# Patient Record
Sex: MALE | Race: ASIAN | Employment: OTHER | URBAN - METROPOLITAN AREA
[De-identification: names, ages, dates, MRNs, and addresses within clinical notes are randomized per-mention and may not be internally consistent; named-entity substitution may affect disease eponyms.]

---

## 2018-07-27 ENCOUNTER — HOSPITAL ENCOUNTER (OUTPATIENT)
Dept: RADIATION ONCOLOGY | Age: 83
Discharge: HOME | End: 2018-07-27
Payer: MEDICARE

## 2018-07-27 VITALS
DIASTOLIC BLOOD PRESSURE: 75 MMHG | HEART RATE: 73 BPM | RESPIRATION RATE: 19 BRPM | SYSTOLIC BLOOD PRESSURE: 162 MMHG | TEMPERATURE: 97.9 F | WEIGHT: 177.6 LBS | HEIGHT: 66 IN | BODY MASS INDEX: 28.54 KG/M2 | OXYGEN SATURATION: 96 %

## 2018-07-27 DIAGNOSIS — C61 CANCER OF PROSTATE WITH HIGH RECURRENCE RISK (STAGE T3A OR GLEASON 8-10 OR PSA > 20): ICD-10-CM

## 2018-07-27 PROCEDURE — 99212 OFFICE O/P EST SF 10 MIN: CPT | Performed by: RADIOLOGY

## 2018-07-27 RX ORDER — ATORVASTATIN CALCIUM 10 MG/1
10 TABLET, FILM COATED ORAL NIGHTLY
COMMUNITY

## 2018-07-27 RX ORDER — LOSARTAN POTASSIUM AND HYDROCHLOROTHIAZIDE 12.5; 1 MG/1; MG/1
1 TABLET ORAL DAILY
COMMUNITY

## 2018-07-27 RX ORDER — AMLODIPINE BESYLATE 10 MG/1
10 TABLET ORAL DAILY
COMMUNITY

## 2018-07-27 RX ORDER — WARFARIN SODIUM 5 MG/1
5 TABLET ORAL DAILY
COMMUNITY

## 2018-07-27 RX ORDER — DIGOXIN 125 MCG
125 TABLET ORAL DAILY
COMMUNITY

## 2018-08-31 ENCOUNTER — HOSPITAL ENCOUNTER (OUTPATIENT)
Dept: RADIATION ONCOLOGY | Age: 83
Discharge: HOME | End: 2018-08-31
Payer: MEDICARE

## 2018-08-31 PROCEDURE — 55876 PLACE RT DEVICE/MARKER PROS: CPT | Performed by: RADIOLOGY

## 2018-08-31 PROCEDURE — 55874 TPRNL PLMT BIODEGRDABL MATRL: CPT | Performed by: RADIOLOGY

## 2018-08-31 PROCEDURE — 99214 OFFICE O/P EST MOD 30 MIN: CPT | Performed by: RADIOLOGY

## 2018-08-31 PROCEDURE — A4648 IMPLANTABLE TISSUE MARKER: HCPCS

## 2018-08-31 PROCEDURE — 76942 ECHO GUIDE FOR BIOPSY: CPT | Performed by: RADIOLOGY

## 2018-09-17 ENCOUNTER — HOSPITAL ENCOUNTER (OUTPATIENT)
Dept: RADIATION ONCOLOGY | Age: 83
Discharge: HOME | End: 2018-09-17
Payer: COMMERCIAL

## 2018-09-17 ENCOUNTER — HOSPITAL ENCOUNTER (OUTPATIENT)
Dept: MRI IMAGING | Age: 83
Discharge: HOME | End: 2018-09-19
Payer: COMMERCIAL

## 2018-09-17 DIAGNOSIS — C61 PROSTATE CA (HCC): ICD-10-CM

## 2018-09-17 PROCEDURE — 6360000004 HC RX CONTRAST MEDICATION: Performed by: RADIOLOGY

## 2018-09-17 PROCEDURE — 77290 THER RAD SIMULAJ FIELD CPLX: CPT | Performed by: RADIOLOGY

## 2018-09-17 PROCEDURE — A9577 INJ MULTIHANCE: HCPCS | Performed by: RADIOLOGY

## 2018-09-17 PROCEDURE — 72197 MRI PELVIS W/O & W/DYE: CPT

## 2018-09-17 PROCEDURE — 99214 OFFICE O/P EST MOD 30 MIN: CPT | Performed by: RADIOLOGY

## 2018-09-17 PROCEDURE — 77334 RADIATION TREATMENT AID(S): CPT | Performed by: RADIOLOGY

## 2018-09-17 RX ADMIN — GADOBENATE DIMEGLUMINE 15 ML: 529 INJECTION, SOLUTION INTRAVENOUS at 11:35

## 2018-09-18 PROCEDURE — 77399 UNLISTED PX MED RADJ PHYSICS: CPT | Performed by: RADIOLOGY

## 2018-09-21 LAB
GFR AFRICAN AMERICAN: 41
GFR NON-AFRICAN AMERICAN: 34
PERFORMED ON: ABNORMAL
POC CREATININE: 1.9 MG/DL (ref 0.8–1.3)
POC SAMPLE TYPE: ABNORMAL

## 2018-09-25 PROCEDURE — 77280 THER RAD SIMULAJ FIELD SMPL: CPT | Performed by: RADIOLOGY

## 2018-09-25 PROCEDURE — 77387 GUIDANCE FOR RADJ TX DLVR: CPT | Performed by: RADIOLOGY

## 2018-09-25 PROCEDURE — 77338 DESIGN MLC DEVICE FOR IMRT: CPT | Performed by: RADIOLOGY

## 2018-09-25 PROCEDURE — 77300 RADIATION THERAPY DOSE PLAN: CPT | Performed by: RADIOLOGY

## 2018-09-25 PROCEDURE — 77301 RADIOTHERAPY DOSE PLAN IMRT: CPT | Performed by: RADIOLOGY

## 2018-09-27 PROCEDURE — 77385 HC NTSTY MODUL RAD TX DLVR SMPL: CPT | Performed by: RADIOLOGY

## 2018-09-28 PROCEDURE — 77385 HC NTSTY MODUL RAD TX DLVR SMPL: CPT | Performed by: RADIOLOGY

## 2018-10-01 ENCOUNTER — HOSPITAL ENCOUNTER (OUTPATIENT)
Dept: RADIATION ONCOLOGY | Age: 83
Discharge: HOME | End: 2018-10-01
Payer: MEDICARE

## 2018-10-01 PROCEDURE — 77385 HC NTSTY MODUL RAD TX DLVR SMPL: CPT | Performed by: RADIOLOGY

## 2018-10-02 PROCEDURE — 99211 OFF/OP EST MAY X REQ PHY/QHP: CPT | Performed by: RADIOLOGY

## 2018-10-02 PROCEDURE — 77385 HC NTSTY MODUL RAD TX DLVR SMPL: CPT | Performed by: RADIOLOGY

## 2018-10-03 PROCEDURE — 77336 RADIATION PHYSICS CONSULT: CPT | Performed by: RADIOLOGY

## 2018-10-03 PROCEDURE — 77385 HC NTSTY MODUL RAD TX DLVR SMPL: CPT | Performed by: RADIOLOGY

## 2018-10-04 PROCEDURE — 77385 HC NTSTY MODUL RAD TX DLVR SMPL: CPT | Performed by: RADIOLOGY

## 2018-10-05 PROCEDURE — 77385 HC NTSTY MODUL RAD TX DLVR SMPL: CPT | Performed by: RADIOLOGY

## 2018-10-08 PROCEDURE — 77300 RADIATION THERAPY DOSE PLAN: CPT | Performed by: RADIOLOGY

## 2018-10-08 PROCEDURE — 77385 HC NTSTY MODUL RAD TX DLVR SMPL: CPT | Performed by: RADIOLOGY

## 2018-10-09 PROCEDURE — 99212 OFFICE O/P EST SF 10 MIN: CPT | Performed by: RADIOLOGY

## 2018-10-09 PROCEDURE — 77385 HC NTSTY MODUL RAD TX DLVR SMPL: CPT | Performed by: RADIOLOGY

## 2018-10-10 PROCEDURE — 77385 HC NTSTY MODUL RAD TX DLVR SMPL: CPT | Performed by: RADIOLOGY

## 2018-10-10 PROCEDURE — 77336 RADIATION PHYSICS CONSULT: CPT | Performed by: RADIOLOGY

## 2018-10-10 PROCEDURE — 99211 OFF/OP EST MAY X REQ PHY/QHP: CPT | Performed by: RADIOLOGY

## 2018-10-11 PROCEDURE — 77385 HC NTSTY MODUL RAD TX DLVR SMPL: CPT | Performed by: RADIOLOGY

## 2018-10-12 PROCEDURE — 77385 HC NTSTY MODUL RAD TX DLVR SMPL: CPT | Performed by: RADIOLOGY

## 2018-10-15 PROCEDURE — 77385 HC NTSTY MODUL RAD TX DLVR SMPL: CPT | Performed by: RADIOLOGY

## 2018-10-16 PROCEDURE — 99212 OFFICE O/P EST SF 10 MIN: CPT | Performed by: RADIOLOGY

## 2018-10-16 PROCEDURE — 77385 HC NTSTY MODUL RAD TX DLVR SMPL: CPT | Performed by: RADIOLOGY

## 2018-10-17 PROCEDURE — 77336 RADIATION PHYSICS CONSULT: CPT | Performed by: RADIOLOGY

## 2018-10-17 PROCEDURE — 77385 HC NTSTY MODUL RAD TX DLVR SMPL: CPT | Performed by: RADIOLOGY

## 2018-10-18 PROCEDURE — 77385 HC NTSTY MODUL RAD TX DLVR SMPL: CPT | Performed by: RADIOLOGY

## 2018-10-19 PROCEDURE — 77385 HC NTSTY MODUL RAD TX DLVR SMPL: CPT | Performed by: RADIOLOGY

## 2018-10-22 PROCEDURE — 77385 HC NTSTY MODUL RAD TX DLVR SMPL: CPT | Performed by: RADIOLOGY

## 2018-10-23 PROCEDURE — 77385 HC NTSTY MODUL RAD TX DLVR SMPL: CPT | Performed by: RADIOLOGY

## 2018-10-23 PROCEDURE — 99212 OFFICE O/P EST SF 10 MIN: CPT | Performed by: RADIOLOGY

## 2018-10-24 PROCEDURE — 77385 HC NTSTY MODUL RAD TX DLVR SMPL: CPT | Performed by: RADIOLOGY

## 2018-10-24 PROCEDURE — 77336 RADIATION PHYSICS CONSULT: CPT | Performed by: RADIOLOGY

## 2018-10-25 PROCEDURE — 77385 HC NTSTY MODUL RAD TX DLVR SMPL: CPT | Performed by: RADIOLOGY

## 2018-10-26 PROCEDURE — 77385 HC NTSTY MODUL RAD TX DLVR SMPL: CPT | Performed by: RADIOLOGY

## 2018-10-29 PROCEDURE — 77385 HC NTSTY MODUL RAD TX DLVR SMPL: CPT | Performed by: RADIOLOGY

## 2018-10-30 VITALS
RESPIRATION RATE: 16 BRPM | HEART RATE: 62 BPM | OXYGEN SATURATION: 97 % | SYSTOLIC BLOOD PRESSURE: 172 MMHG | DIASTOLIC BLOOD PRESSURE: 79 MMHG | WEIGHT: 174.8 LBS | TEMPERATURE: 96.6 F | BODY MASS INDEX: 28.21 KG/M2

## 2018-10-30 PROCEDURE — 77385 HC NTSTY MODUL RAD TX DLVR SMPL: CPT | Performed by: RADIOLOGY

## 2018-10-30 PROCEDURE — 99212 OFFICE O/P EST SF 10 MIN: CPT | Performed by: RADIOLOGY

## 2018-10-31 PROCEDURE — 77385 HC NTSTY MODUL RAD TX DLVR SMPL: CPT | Performed by: RADIOLOGY

## 2018-10-31 PROCEDURE — 77336 RADIATION PHYSICS CONSULT: CPT | Performed by: RADIOLOGY

## 2018-11-01 ENCOUNTER — HOSPITAL ENCOUNTER (OUTPATIENT)
Dept: RADIATION ONCOLOGY | Age: 83
Discharge: HOME | End: 2018-11-01
Payer: MEDICARE

## 2018-11-01 PROCEDURE — 77385 HC NTSTY MODUL RAD TX DLVR SMPL: CPT | Performed by: RADIOLOGY

## 2018-11-02 PROCEDURE — 77385 HC NTSTY MODUL RAD TX DLVR SMPL: CPT | Performed by: RADIOLOGY

## 2018-11-05 PROCEDURE — 77385 HC NTSTY MODUL RAD TX DLVR SMPL: CPT | Performed by: RADIOLOGY

## 2018-11-06 PROCEDURE — 77385 HC NTSTY MODUL RAD TX DLVR SMPL: CPT | Performed by: RADIOLOGY

## 2018-11-06 PROCEDURE — 99212 OFFICE O/P EST SF 10 MIN: CPT | Performed by: RADIOLOGY

## 2018-11-07 PROCEDURE — 77385 HC NTSTY MODUL RAD TX DLVR SMPL: CPT | Performed by: RADIOLOGY

## 2018-11-07 PROCEDURE — 77336 RADIATION PHYSICS CONSULT: CPT | Performed by: RADIOLOGY

## 2018-11-08 PROCEDURE — 77385 HC NTSTY MODUL RAD TX DLVR SMPL: CPT | Performed by: RADIOLOGY

## 2018-11-09 PROCEDURE — 77385 HC NTSTY MODUL RAD TX DLVR SMPL: CPT | Performed by: RADIOLOGY

## 2018-11-12 PROCEDURE — 77385 HC NTSTY MODUL RAD TX DLVR SMPL: CPT | Performed by: RADIOLOGY

## 2018-11-13 PROCEDURE — 99213 OFFICE O/P EST LOW 20 MIN: CPT | Performed by: RADIOLOGY

## 2018-11-13 PROCEDURE — 77385 HC NTSTY MODUL RAD TX DLVR SMPL: CPT | Performed by: RADIOLOGY

## 2018-11-13 RX ORDER — LOPERAMIDE HYDROCHLORIDE 2 MG/1
2 CAPSULE ORAL 4 TIMES DAILY PRN
COMMUNITY

## 2018-11-14 PROCEDURE — 77336 RADIATION PHYSICS CONSULT: CPT | Performed by: RADIOLOGY

## 2018-11-14 PROCEDURE — 77385 HC NTSTY MODUL RAD TX DLVR SMPL: CPT | Performed by: RADIOLOGY

## 2018-11-15 PROCEDURE — 77385 HC NTSTY MODUL RAD TX DLVR SMPL: CPT | Performed by: RADIOLOGY

## 2018-11-16 PROCEDURE — 77385 HC NTSTY MODUL RAD TX DLVR SMPL: CPT | Performed by: RADIOLOGY

## 2018-11-19 PROCEDURE — 77385 HC NTSTY MODUL RAD TX DLVR SMPL: CPT | Performed by: RADIOLOGY

## 2018-11-20 VITALS
BODY MASS INDEX: 28.44 KG/M2 | WEIGHT: 176.2 LBS | OXYGEN SATURATION: 98 % | HEART RATE: 65 BPM | RESPIRATION RATE: 16 BRPM | DIASTOLIC BLOOD PRESSURE: 74 MMHG | SYSTOLIC BLOOD PRESSURE: 169 MMHG | TEMPERATURE: 96.8 F

## 2018-11-20 PROCEDURE — 77385 HC NTSTY MODUL RAD TX DLVR SMPL: CPT | Performed by: RADIOLOGY

## 2018-11-20 PROCEDURE — 99212 OFFICE O/P EST SF 10 MIN: CPT | Performed by: RADIOLOGY

## 2018-11-21 PROCEDURE — 77336 RADIATION PHYSICS CONSULT: CPT | Performed by: RADIOLOGY

## 2019-02-27 ENCOUNTER — HOSPITAL ENCOUNTER (OUTPATIENT)
Dept: RADIATION ONCOLOGY | Age: 84
Discharge: HOME | End: 2019-02-27
Payer: COMMERCIAL

## 2019-02-27 VITALS
DIASTOLIC BLOOD PRESSURE: 67 MMHG | WEIGHT: 181.6 LBS | HEART RATE: 83 BPM | SYSTOLIC BLOOD PRESSURE: 147 MMHG | BODY MASS INDEX: 29.31 KG/M2 | TEMPERATURE: 97.8 F | OXYGEN SATURATION: 98 % | RESPIRATION RATE: 16 BRPM

## 2019-02-27 PROCEDURE — 99213 OFFICE O/P EST LOW 20 MIN: CPT | Performed by: RADIOLOGY

## 2019-02-27 RX ORDER — TAMSULOSIN HYDROCHLORIDE 0.4 MG/1
0.4 CAPSULE ORAL DAILY
Qty: 90 CAPSULE | Refills: 1 | Status: SHIPPED | OUTPATIENT
Start: 2019-02-27

## 2019-09-16 ENCOUNTER — HOSPITAL ENCOUNTER (OUTPATIENT)
Dept: RADIATION ONCOLOGY | Age: 84
Discharge: HOME | End: 2019-09-16
Payer: COMMERCIAL

## 2019-09-16 VITALS
HEART RATE: 74 BPM | SYSTOLIC BLOOD PRESSURE: 145 MMHG | DIASTOLIC BLOOD PRESSURE: 62 MMHG | BODY MASS INDEX: 28.83 KG/M2 | WEIGHT: 178.6 LBS | OXYGEN SATURATION: 99 % | RESPIRATION RATE: 16 BRPM | TEMPERATURE: 97 F

## 2019-09-16 PROCEDURE — 99212 OFFICE O/P EST SF 10 MIN: CPT | Performed by: RADIOLOGY

## 2019-09-16 NOTE — ONCOLOGY
History     Substance and Sexual Activity   Alcohol Use Yes    Alcohol/week: 3.0 standard drinks    Types: 2 Glasses of wine, 1 Cans of beer per week    Comment: rare social drinker       ALLERGIES:   No Known Allergies     CURRENT MEDICATIONS:     Prior to Admission medications    Medication Sig Start Date End Date Taking? Authorizing Provider   tamsulosin (FLOMAX) 0.4 MG capsule Take 1 capsule by mouth daily Take 1/2 hour after last meal of the day. 2/27/19  Yes Cammy Sanders MD   Carbidopa-Levodopa (SINEMET PO) Take 1 tablet by mouth 2 times daily   Yes Historical Provider, MD   metFORMIN (GLUCOPHAGE) 1000 MG tablet Take 1,000 mg by mouth 2 times daily (with meals)   Yes Historical Provider, MD   losartan-hydrochlorothiazide (HYZAAR) 100-12.5 MG per tablet Take 1 tablet by mouth daily   Yes Historical Provider, MD   digoxin (DIGOX) 125 MCG tablet Take 125 mcg by mouth daily   Yes Historical Provider, MD   SITAGLIPTIN-METFORMIN HCL PO Take 1 tablet by mouth daily   Yes Historical Provider, MD   warfarin (COUMADIN) 5 MG tablet Take 5 mg by mouth daily Pt to take 5mg 5 days a week and 2.5mg twice a week   Yes Historical Provider, MD   amLODIPine (NORVASC) 10 MG tablet Take 10 mg by mouth daily   Yes Historical Provider, MD   Insulin Glargine (LANTUS SC) Inject 14 Units into the skin nightly   Yes Historical Provider, MD   atorvastatin (LIPITOR) 10 MG tablet Take 10 mg by mouth daily   Yes Historical Provider, MD   loperamide (IMODIUM) 2 MG capsule Take 2 mg by mouth 4 times daily as needed for Diarrhea    Historical Provider, MD     ECOG PERFORMANCE STATUS: 0    VITAL SIGNS:    Vitals:    09/16/19 1523   BP: (!) 145/62   Pulse: 74   Resp: 16   Temp: 97 °F (36.1 °C)   SpO2: 99%   Weight: 178 lb 9.6 oz (81 kg)     PHYSICAL EXAMINATION:  GENERAL: No acute distress. Alert, oriented, cooperative. With daughter. HEENT:  JOSE ROBERTO MELTON. Oral cavity WNL. NECK:  No cervical or supraclavicular adenopathy.   CHEST/LUNGS: CTA  CARDIOVASCULAR:  Mostly regular rate and rhythm with occasional ectopy, no definite murmur. ABDOMEN:  benign  EXTREMITIES: No cyanosis or clubbing, trace and stable pretibial edema L>R  RECTAL:  Deferred. Undetectable PSA, and resolution of abnormality on exam with last visit. No symptoms. STUDIES: PSA <0.10,  system, 9/7/19    IMPRESSION/PLAN: Clinically DERRICK, no significant symptoms related to prior treatment. I will see the patient back in 1 year. He sees Dr. Dirk Aguilar every 6 months. Electronically signed by Marion Leslie MD on 9/16/19 at 4:10 PM      Thank you for allowing us to participate in the care of this patient.   cc:   No att. providers found  MD Venecia Grewal, [address] [facility] [place] [address]

## 2020-05-20 ENCOUNTER — HOSPITAL ENCOUNTER (OUTPATIENT)
Dept: RADIATION ONCOLOGY | Age: 85
Discharge: HOME | End: 2020-05-20
Payer: COMMERCIAL

## 2020-05-20 VITALS
WEIGHT: 188.2 LBS | DIASTOLIC BLOOD PRESSURE: 67 MMHG | BODY MASS INDEX: 30.38 KG/M2 | TEMPERATURE: 97.4 F | SYSTOLIC BLOOD PRESSURE: 154 MMHG | RESPIRATION RATE: 18 BRPM | HEART RATE: 53 BPM

## 2020-05-20 PROCEDURE — 99212 OFFICE O/P EST SF 10 MIN: CPT | Performed by: RADIOLOGY

## 2020-05-20 RX ORDER — M-VIT,TX,IRON,MINS/CALC/FOLIC 27MG-0.4MG
1 TABLET ORAL DAILY
COMMUNITY

## 2020-05-20 RX ORDER — MEMANTINE HYDROCHLORIDE 10 MG/1
10 TABLET ORAL 2 TIMES DAILY
COMMUNITY

## 2023-04-28 ENCOUNTER — TELEPHONE (OUTPATIENT)
Dept: PRIMARY CARE | Facility: CLINIC | Age: 88
End: 2023-04-28
Payer: COMMERCIAL

## 2023-04-28 DIAGNOSIS — R26.0 ATAXIC GAIT: Primary | ICD-10-CM

## 2023-04-28 DIAGNOSIS — R53.81 DEBILITY: ICD-10-CM

## 2024-02-28 DIAGNOSIS — R97.20 ELEVATED PSA: ICD-10-CM

## 2024-02-28 DIAGNOSIS — C61 PROSTATE CANCER (MULTI): ICD-10-CM

## 2024-04-20 ENCOUNTER — LAB (OUTPATIENT)
Dept: LAB | Facility: LAB | Age: 89
End: 2024-04-20
Payer: COMMERCIAL

## 2024-04-20 DIAGNOSIS — C61 PROSTATE CANCER (MULTI): ICD-10-CM

## 2024-04-20 DIAGNOSIS — R97.20 ELEVATED PSA: ICD-10-CM

## 2024-04-20 LAB
ALBUMIN SERPL BCP-MCNC: 3.6 G/DL (ref 3.4–5)
ALP SERPL-CCNC: 54 U/L (ref 33–136)
ALT SERPL W P-5'-P-CCNC: 14 U/L (ref 10–52)
AST SERPL W P-5'-P-CCNC: 23 U/L (ref 9–39)
BILIRUB DIRECT SERPL-MCNC: 0.2 MG/DL (ref 0–0.3)
BILIRUB SERPL-MCNC: 0.6 MG/DL (ref 0–1.2)
PROT SERPL-MCNC: 6.7 G/DL (ref 6.4–8.2)
PSA SERPL-MCNC: 0.92 NG/ML
TESTOST SERPL-MCNC: <30 NG/DL (ref 240–1000)

## 2024-04-20 PROCEDURE — 80076 HEPATIC FUNCTION PANEL: CPT

## 2024-04-20 PROCEDURE — 84403 ASSAY OF TOTAL TESTOSTERONE: CPT

## 2024-04-20 PROCEDURE — 36415 COLL VENOUS BLD VENIPUNCTURE: CPT

## 2024-04-20 PROCEDURE — 84153 ASSAY OF PSA TOTAL: CPT

## 2024-04-26 ENCOUNTER — OFFICE VISIT (OUTPATIENT)
Dept: UROLOGY | Facility: CLINIC | Age: 89
End: 2024-04-26
Payer: COMMERCIAL

## 2024-04-26 VITALS
HEIGHT: 67 IN | WEIGHT: 177.69 LBS | RESPIRATION RATE: 16 BRPM | SYSTOLIC BLOOD PRESSURE: 102 MMHG | HEART RATE: 61 BPM | BODY MASS INDEX: 27.89 KG/M2 | DIASTOLIC BLOOD PRESSURE: 54 MMHG

## 2024-04-26 DIAGNOSIS — R35.1 NOCTURIA: ICD-10-CM

## 2024-04-26 DIAGNOSIS — C61 PROSTATE CANCER (MULTI): ICD-10-CM

## 2024-04-26 PROCEDURE — 1036F TOBACCO NON-USER: CPT | Performed by: UROLOGY

## 2024-04-26 PROCEDURE — 1126F AMNT PAIN NOTED NONE PRSNT: CPT | Performed by: UROLOGY

## 2024-04-26 PROCEDURE — 99213 OFFICE O/P EST LOW 20 MIN: CPT | Performed by: UROLOGY

## 2024-04-26 PROCEDURE — 1159F MED LIST DOCD IN RCRD: CPT | Performed by: UROLOGY

## 2024-04-26 PROCEDURE — 1157F ADVNC CARE PLAN IN RCRD: CPT | Performed by: UROLOGY

## 2024-04-26 RX ORDER — CARVEDILOL 6.25 MG/1
TABLET ORAL
COMMUNITY

## 2024-04-26 RX ORDER — DIGOXIN 125 MCG
125 TABLET ORAL DAILY
COMMUNITY

## 2024-04-26 RX ORDER — DAPAGLIFLOZIN 10 MG/1
10 TABLET, FILM COATED ORAL DAILY
COMMUNITY

## 2024-04-26 RX ORDER — AMLODIPINE BESYLATE 10 MG/1
10 TABLET ORAL DAILY
COMMUNITY

## 2024-04-26 RX ORDER — TAMSULOSIN HYDROCHLORIDE 0.4 MG/1
1 CAPSULE ORAL DAILY
COMMUNITY
Start: 2021-09-17

## 2024-04-26 RX ORDER — ALBUTEROL SULFATE 0.83 MG/ML
2.5 SOLUTION RESPIRATORY (INHALATION) DAILY
COMMUNITY
Start: 2024-04-13

## 2024-04-26 RX ORDER — ALBUTEROL SULFATE 90 UG/1
2 AEROSOL, METERED RESPIRATORY (INHALATION) EVERY 4 HOURS PRN
COMMUNITY
Start: 2021-11-03

## 2024-04-26 RX ORDER — WARFARIN SODIUM 5 MG/1
TABLET ORAL
COMMUNITY
Start: 2020-05-15

## 2024-04-26 RX ORDER — MEMANTINE HYDROCHLORIDE 10 MG/1
TABLET ORAL EVERY 24 HOURS
COMMUNITY

## 2024-04-26 RX ORDER — ATORVASTATIN CALCIUM 20 MG/1
20 TABLET, FILM COATED ORAL EVERY 24 HOURS
COMMUNITY
Start: 2018-02-14

## 2024-04-26 RX ORDER — FUROSEMIDE 40 MG/1
TABLET ORAL
COMMUNITY
Start: 2024-04-20

## 2024-04-26 RX ORDER — CHLORTHALIDONE 25 MG/1
25 TABLET ORAL
COMMUNITY

## 2024-04-26 RX ORDER — CARBIDOPA AND LEVODOPA 25; 100 MG/1; MG/1
TABLET ORAL EVERY 12 HOURS
COMMUNITY
Start: 2018-02-15

## 2024-04-26 RX ORDER — MONTELUKAST SODIUM 10 MG/1
10 TABLET ORAL DAILY
COMMUNITY
Start: 2024-04-13

## 2024-04-26 ASSESSMENT — ENCOUNTER SYMPTOMS
LOSS OF SENSATION IN FEET: 0
OCCASIONAL FEELINGS OF UNSTEADINESS: 1

## 2024-04-26 ASSESSMENT — PAIN SCALES - GENERAL: PAINLEVEL: 0-NO PAIN

## 2024-04-26 NOTE — PROGRESS NOTES
Patient present with daughter, Nikole and More. Patient denies any pain today. Patient has not had any recent surgeries or hospital admits. Patient denies any concerns about falling or safety. Patient has occasional incontinence, due to mobility.Patient taking Flomax as directed, stopped taking Casodex in October. CV     Review of Systems   Genitourinary:  Positive for enuresis.

## 2024-04-26 NOTE — LETTER
April 26, 2024     Steve Yates MD  [address]   [facility], [address]  [facility] [address]    Patient: Blaine Cordero   YOB: 1935   Date of Visit: 4/26/2024       Dear Dr. Steve Yates MD:    Thank you for referring Blaine Cordero to me for evaluation. Below are my notes for this consultation.  If you have questions, please do not hesitate to call me. I look forward to following your patient along with you.       Sincerely,     Bhaskar Hamilton MD      CC: No Recipients  ______________________________________________________________________________________      Provider Impressions     88 year-old [place] male referred by Dr. Yates for an ELEVATED PSA, NOCTURIA, URINARY frequency, and insulin-dependent diabetes mellitus, IDDM. The patient's daughter is a physician. His PSA on 02/05/18 was 32.61. Urine analysis was negative. He is on Coumadin. The patient has a positive family history for breast cancer. He has a 10-pack-year cigarette smoking history.      COUMADIN      06/25/18, the patient is accompanied by his granddaughter Gricel. He is a retired , retired at the age of 58. He spends 50% of his time in the [facility]. He travels approximately every 2 months. Repeat PSA is 54.6. Urine studies are negative. Renal ultrasound shows a thickened bladder wall. Benefits, risks, and potential complications of PSA screening and prostate biopsies was discussed with the patient. He is presently on Coumadin and his cardiologist is in the [facility]. We need to check with Dr. Fairbanks, his cardiologist to discontinue the Coumadin. This will be a difficult arrangement as the patient is leaving in the next 3 weeks for a minimum of 2 months to go back to the [facility]. We have discussed treatment options and he is leaning towards hormonal therapy at this time if he does have prostate cancer. He clearly is not interested in surgery. Radiation  oncology is still a possibility.     07/06/18, TRUS biopsy (8). PSA 54.6. No nodules. Volume 32.0 cc. PSA density 1.71. Limited biopsy due to size. No transition zone. Pathology: [place] 9 PROSTATE CANCER     07/21/18,, patient returns with his daughter pain. A full discussion regarding the benefits, risks, and potential complications and all therapeutic options including surgery, radiation therapy, hormonal therapy and active surveillance was carried out with the patient and his daughter. They will meet with Dr. Mcbride our radiation oncologist and return.     07/27/18, patient met with Dr. Mcbride, radiation oncology. They will proceed with external beam radiation therapy. Dr. Mcbride suggests hormone deprivation therapy for 1 year.     08/08/18 following 10 days of Casodex 50 mg by mouth daily, the patient received Lupron 45 mg IM today. His first dose.     08/21/18, patient returns with his daughter Nikole. They will be scheduled to begin radiation therapy in approximately one month. He will be traveling to the [facility] and will return in February for her second dose of Lupron. This may be his last dose depending upon Dr. Mcbride's recommendations. We will obtain a PSA at that time.     11/20/18, completed XRT with Dr. Mcbride     02/27/19, office visit, Dr. Mcbride, recommends Lupron continues for a total of 2 years.     03/01/19, patient returns with his daughter Jodie. He will receive his second dose of Lupron following completion of his radiation therapy with Dr. Mcbride. PSA is less than 0.1, LFTs normal and testosterone less than 3. We will check with Dr. Mcbride's office again to coordinate care. We will term and whether Dr. Mcbride would suggest continued Lupron therapy. I will see the patient again in 6 months with appropriate labs and possibly another Lupron injection.     09/11/19, patient returns with a family friend. He will receive his third dose of Lupron following completion of his radiation therapy 10 months ago with   [facility]. This is his third of 4 doses. PSA continues to be less than 0.1. LFTs are normal and testosterone is less than 30. He has no complaints or side effects noted. He will return in 6 months for his fourth and last dose of Lupron in this series.     September 17, 2021, patient arrives with his son-in-law Alexx. He is now 1 year and 6 months since his last injection of Lupron and his PSA is 0.27. He is now 2 years and 6 months status post completion of XRT for [place] 9 PROSTATE CANCER with Dr. Mcbride. He continues to have some INCONTINENCE and wishes to restart Flomax. His renal function is followed by Dr. Mi. We both agreed that should his PSA rise above 1.0 we would begin Casodex and Lupron indefinitely. He will return in 6 months.     May 31, 2022, patient arrives with his daughter Vernon. He is now 2 years and 2 months since his last injection of Lupron and his PSA is now 0.82. He is now 3 years and 2 months status post completion of XRT for [place] 9 prostate cancer under the direction of Dr. Ronald Mcbride. Renal function is followed by Dr. Mi. Again we agreed should his PSA rise above 1.0 that we would begin Casodex and Lupron indefinitely. Combined androgen deprivation. He will return in 6 months.     November 12, 2022, telephone call, PSA is risen to 2.07 and he would like to begin combined androgen blockade indefinitely.     November 28, 2022, patient arrives with his daughter More. He is in a wheelchair. He is now 2 years and 8 months since his last injection of Lupron and his PSA is risen to 2.08. He is now 3 years and 8 months status post completion of XRT for [place] 9 prostate cancer under the direction of Dr. Ronald Mcbride. His renal function is followed by his nephrologist, Dr. Joseph Mi. We will now begin combined androgen blockade in the form of Casodex 50 mg daily and Lupron 45 mg IM every 6 months. He received his dose today. He will return in 6 months. Continue daily Flomax.    January 24,  2024, PSA in the [facility] 0.422    April 26, 2024, patient arrives with his 2 daughters, More and Nikole.  He is in a wheelchair.  He has [place] 9 prostate cancer and underwent XRT under the direction of Dr. Ronald Mcbride.  Pleated November 18, 2018.  1 Lupron injection daily Casodex November 22, 2022.  He has discontinued his Casodex due to side effects endocrinology.  Renal function has returned to normal.  PSA is now 0.92.  Testosterone less than 30.  We have agreed not to reinitiate intermittent hormone combined androgen blockade for 6 months unless the PSA rises above 4.0.  He lives half of the year in the [facility] and will have that tested in 6 months.  Should he require treatment again he will receive Lupron 45 mg IM x 1 for 6 months and Casodex daily for 6 months.  He will return in approximately 1 year and have a PSA in 6 months.       PLAN:     #1 return in 1 year with a PSA, testosterone, LFTs      #2 Flomax 0.4 mg p.o. daily       This note was created with voice-recognition software and was not corrected for typographical or grammatical errors.

## 2024-04-26 NOTE — PROGRESS NOTES
Provider Impressions     88 year-old [place] male referred by Dr. Yates for an ELEVATED PSA, NOCTURIA, URINARY frequency, and insulin-dependent diabetes mellitus, IDDM. The patient's daughter is a physician. His PSA on 02/05/18 was 32.61. Urine analysis was negative. He is on Coumadin. The patient has a positive family history for breast cancer. He has a 10-pack-year cigarette smoking history.      COUMADIN      06/25/18, the patient is accompanied by his granddaughter Gricel. He is a retired , retired at the age of 58. He spends 50% of his time in the [facility]. He travels approximately every 2 months. Repeat PSA is 54.6. Urine studies are negative. Renal ultrasound shows a thickened bladder wall. Benefits, risks, and potential complications of PSA screening and prostate biopsies was discussed with the patient. He is presently on Coumadin and his cardiologist is in the [facility]. We need to check with Dr. Fairbanks, his cardiologist to discontinue the Coumadin. This will be a difficult arrangement as the patient is leaving in the next 3 weeks for a minimum of 2 months to go back to the [facility]. We have discussed treatment options and he is leaning towards hormonal therapy at this time if he does have prostate cancer. He clearly is not interested in surgery. Radiation oncology is still a possibility.     07/06/18, TRUS biopsy (8). PSA 54.6. No nodules. Volume 32.0 cc. PSA density 1.71. Limited biopsy due to size. No transition zone. Pathology: [place] 9 PROSTATE CANCER     07/21/18,, patient returns with his daughter pain. A full discussion regarding the benefits, risks, and potential complications and all therapeutic options including surgery, radiation therapy, hormonal therapy and active surveillance was carried out with the patient and his daughter. They will meet with Dr. Mcbride our radiation oncologist and return.     07/27/18, patient met with Dr. Mcbride, radiation oncology. They will proceed  with external beam radiation therapy. Dr. Mcbride suggests hormone deprivation therapy for 1 year.     08/08/18 following 10 days of Casodex 50 mg by mouth daily, the patient received Lupron 45 mg IM today. His first dose.     08/21/18, patient returns with his daughter Nikole. They will be scheduled to begin radiation therapy in approximately one month. He will be traveling to the [facility] and will return in February for her second dose of Lupron. This may be his last dose depending upon Dr. Mcbride's recommendations. We will obtain a PSA at that time.     11/20/18, completed XRT with Dr. Mcbride     02/27/19, office visit, Dr. Mcbride, recommends Lupron continues for a total of 2 years.     03/01/19, patient returns with his daughter Jodie. He will receive his second dose of Lupron following completion of his radiation therapy with Dr. Mcbride. PSA is less than 0.1, LFTs normal and testosterone less than 3. We will check with Dr. Mcbride's office again to coordinate care. We will term and whether Dr. Mcbride would suggest continued Lupron therapy. I will see the patient again in 6 months with appropriate labs and possibly another Lupron injection.     09/11/19, patient returns with a family friend. He will receive his third dose of Lupron following completion of his radiation therapy 10 months ago with Dr. Mcbride. This is his third of 4 doses. PSA continues to be less than 0.1. LFTs are normal and testosterone is less than 30. He has no complaints or side effects noted. He will return in 6 months for his fourth and last dose of Lupron in this series.     September 17, 2021, patient arrives with his son-in-law Alexx. He is now 1 year and 6 months since his last injection of Lupron and his PSA is 0.27. He is now 2 years and 6 months status post completion of XRT for [place] 9 PROSTATE CANCER with Dr. Mcbride. He continues to have some INCONTINENCE and wishes to restart Flomax. His renal function is followed by Dr. Mi. We both agreed that  should his PSA rise above 1.0 we would begin Casodex and Lupron indefinitely. He will return in 6 months.     May 31, 2022, patient arrives with his daughter Vernon. He is now 2 years and 2 months since his last injection of Lupron and his PSA is now 0.82. He is now 3 years and 2 months status post completion of XRT for [place] 9 prostate cancer under the direction of Dr. Ronald Mcbride. Renal function is followed by Dr. Mi. Again we agreed should his PSA rise above 1.0 that we would begin Casodex and Lupron indefinitely. Combined androgen deprivation. He will return in 6 months.     November 12, 2022, telephone call, PSA is risen to 2.07 and he would like to begin combined androgen blockade indefinitely.     November 28, 2022, patient arrives with his daughter More. He is in a wheelchair. He is now 2 years and 8 months since his last injection of Lupron and his PSA is risen to 2.08. He is now 3 years and 8 months status post completion of XRT for [place] 9 prostate cancer under the direction of Dr. Ronald Mcbride. His renal function is followed by his nephrologist, Dr. Joseph Mi. We will now begin combined androgen blockade in the form of Casodex 50 mg daily and Lupron 45 mg IM every 6 months. He received his dose today. He will return in 6 months. Continue daily Flomax.    January 24, 2024, PSA in the [facility] 0.422    April 26, 2024, patient arrives with his 2 daughters, More and Nikole.  He is in a wheelchair.  He has [place] 9 prostate cancer and underwent XRT under the direction of Dr. Ronald Mcbride.  Pleated November 18, 2018.  1 Lupron injection daily Casodex November 22, 2022.  He has discontinued his Casodex due to side effects endocrinology.  Renal function has returned to normal.  PSA is now 0.92.  Testosterone less than 30.  We have agreed not to reinitiate intermittent hormone combined androgen blockade for 6 months unless the PSA rises above 4.0.  He lives half of the year in the [facility] and will have  that tested in 6 months.  Should he require treatment again he will receive Lupron 45 mg IM x 1 for 6 months and Casodex daily for 6 months.  He will return in approximately 1 year and have a PSA in 6 months.       PLAN:     #1 return in 1 year with a PSA, testosterone, LFTs      #2 Flomax 0.4 mg p.o. daily       This note was created with voice-recognition software and was not corrected for typographical or grammatical errors.

## 2024-04-26 NOTE — PATIENT INSTRUCTIONS
Patient Discussion/Summary     It was very nice to see you and your daughters Summer again.. Your completed your radiation therapy with Dr. Mcbride 5 years  ago. Your PSA is now 0.92.  We agreed not to start hormone treatments unless the PSA rises above 4.0.     This note was created with voice-recognition software and was not corrected for typographical or grammatical errors.